# Patient Record
Sex: MALE | Race: WHITE | URBAN - METROPOLITAN AREA
[De-identification: names, ages, dates, MRNs, and addresses within clinical notes are randomized per-mention and may not be internally consistent; named-entity substitution may affect disease eponyms.]

---

## 2022-09-02 ENCOUNTER — OFFICE VISIT (OUTPATIENT)
Dept: URBAN - METROPOLITAN AREA CLINIC 128 | Facility: CLINIC | Age: 62
End: 2022-09-02
Payer: COMMERCIAL

## 2022-09-02 ENCOUNTER — WEB ENCOUNTER (OUTPATIENT)
Dept: URBAN - METROPOLITAN AREA CLINIC 128 | Facility: CLINIC | Age: 62
End: 2022-09-02

## 2022-09-02 VITALS — SYSTOLIC BLOOD PRESSURE: 138 MMHG | DIASTOLIC BLOOD PRESSURE: 80 MMHG | WEIGHT: 181.2 LBS | TEMPERATURE: 97.1 F

## 2022-09-02 DIAGNOSIS — R93.89 ABNORMAL CT SCAN, CHEST: ICD-10-CM

## 2022-09-02 DIAGNOSIS — Z79.01 CHRONIC ANTICOAGULATION: ICD-10-CM

## 2022-09-02 DIAGNOSIS — Z12.11 COLON CANCER SCREENING: ICD-10-CM

## 2022-09-02 PROBLEM — 711150003: Status: ACTIVE | Noted: 2022-09-02

## 2022-09-02 PROCEDURE — 99244 OFF/OP CNSLTJ NEW/EST MOD 40: CPT | Performed by: INTERNAL MEDICINE

## 2022-09-02 RX ORDER — FENOFIBRATE 145 MG/1
1 TABLET TABLET, FILM COATED ORAL ONCE A DAY
Status: ACTIVE | COMMUNITY

## 2022-09-02 RX ORDER — CLOPIDOGREL 75 MG/1
1 TABLET TABLET, FILM COATED ORAL ONCE A DAY
Status: ACTIVE | COMMUNITY

## 2022-09-02 RX ORDER — KRILL/OM-3/DHA/EPA/PHOSPHO/AST 1000-230MG
1 TABLET CAPSULE ORAL ONCE A DAY
Status: ACTIVE | COMMUNITY

## 2022-09-02 RX ORDER — ALBUTEROL SULFATE 90 UG/1
1 PUFF AS NEEDED AEROSOL, METERED RESPIRATORY (INHALATION)
Status: ACTIVE | COMMUNITY

## 2022-09-02 RX ORDER — PRASUGREL HYDROCHLORIDE 10 MG/1
AS DIRECTED TABLET, COATED ORAL
Status: ACTIVE | COMMUNITY

## 2022-09-02 RX ORDER — METOPROLOL SUCCINATE 25 MG/1
1 TABLET TABLET, FILM COATED, EXTENDED RELEASE ORAL ONCE A DAY
Status: ACTIVE | COMMUNITY

## 2022-09-02 RX ORDER — ATORVASTATIN CALCIUM 20 MG/1
1 TABLET TABLET, FILM COATED ORAL ONCE A DAY
Status: ACTIVE | COMMUNITY

## 2022-09-02 NOTE — HPI-TODAY'S VISIT:
Mr. Lawrence is a 62 year old  who was referred by Dr. Cisneros. A copy of this note will be sent to patient's referring physician.  On 5/27/2022 CT thorax without contrast was performed for pulmonary nodules; the CT showed diffuse thickening of the thoracic esophagus may be due to esophagitis but clinical correlation with EGD findings would be recommended to exclude esophageal cancer especially associated with the distal third of the esophagus.   He sees Dr. Hunt for PVD/AAA repair in 2015 and 2019. He is on effient and aspirin 81mg.   He sees Dr. Wang for CAD/HTN for metoprolol and imdur. He had heart cath 4/9/2022 x 3 stents, echo 4/9/2022 EF 52%.   He has never had a colonoscopy before.

## 2022-09-12 ENCOUNTER — TELEPHONE ENCOUNTER (OUTPATIENT)
Dept: URBAN - METROPOLITAN AREA CLINIC 19 | Facility: CLINIC | Age: 62
End: 2022-09-12

## 2022-09-20 ENCOUNTER — TELEPHONE ENCOUNTER (OUTPATIENT)
Dept: URBAN - METROPOLITAN AREA CLINIC 19 | Facility: CLINIC | Age: 62
End: 2022-09-20

## 2022-09-28 ENCOUNTER — OFFICE VISIT (OUTPATIENT)
Dept: URBAN - METROPOLITAN AREA SURGERY CENTER 31 | Facility: SURGERY CENTER | Age: 62
End: 2022-09-28
Payer: COMMERCIAL

## 2022-09-28 ENCOUNTER — CLAIMS CREATED FROM THE CLAIM WINDOW (OUTPATIENT)
Dept: URBAN - METROPOLITAN AREA CLINIC 4 | Facility: CLINIC | Age: 62
End: 2022-09-28
Payer: COMMERCIAL

## 2022-09-28 DIAGNOSIS — B37.81 CANDIDAL ESOPHAGITIS: ICD-10-CM

## 2022-09-28 DIAGNOSIS — K21.9 ACID REFLUX: ICD-10-CM

## 2022-09-28 DIAGNOSIS — K29.60 OTHER GASTRITIS WITHOUT BLEEDING: ICD-10-CM

## 2022-09-28 DIAGNOSIS — K29.60 ADENOPAPILLOMATOSIS GASTRICA: ICD-10-CM

## 2022-09-28 DIAGNOSIS — B37.81 CANDIDA: ICD-10-CM

## 2022-09-28 DIAGNOSIS — Z12.11 COLON CANCER SCREENING: ICD-10-CM

## 2022-09-28 DIAGNOSIS — K63.5 BENIGN COLON POLYP: ICD-10-CM

## 2022-09-28 DIAGNOSIS — B96.81 HELICOBACTER PYLORI [H. PYLORI] AS THE CAUSE OF DISEASES CLASSIFIED ELSEWHERE: ICD-10-CM

## 2022-09-28 DIAGNOSIS — K63.89 OTHER SPECIFIED DISEASES OF INTESTINE: ICD-10-CM

## 2022-09-28 DIAGNOSIS — K21.9 GASTRO-ESOPHAGEAL REFLUX DISEASE WITHOUT ESOPHAGITIS: ICD-10-CM

## 2022-09-28 DIAGNOSIS — B96.81 BACTERIAL INFECTION DUE TO H. PYLORI: ICD-10-CM

## 2022-09-28 PROCEDURE — 88312 SPECIAL STAINS GROUP 1: CPT | Performed by: PATHOLOGY

## 2022-09-28 PROCEDURE — G8907 PT DOC NO EVENTS ON DISCHARG: HCPCS | Performed by: INTERNAL MEDICINE

## 2022-09-28 PROCEDURE — 43239 EGD BIOPSY SINGLE/MULTIPLE: CPT | Performed by: INTERNAL MEDICINE

## 2022-09-28 PROCEDURE — 45385 COLONOSCOPY W/LESION REMOVAL: CPT | Performed by: INTERNAL MEDICINE

## 2022-09-28 PROCEDURE — 88305 TISSUE EXAM BY PATHOLOGIST: CPT | Performed by: PATHOLOGY

## 2022-09-28 PROCEDURE — 88342 IMHCHEM/IMCYTCHM 1ST ANTB: CPT | Performed by: PATHOLOGY

## 2022-09-28 RX ORDER — CLOPIDOGREL 75 MG/1
1 TABLET TABLET, FILM COATED ORAL ONCE A DAY
Status: ACTIVE | COMMUNITY

## 2022-09-28 RX ORDER — METOPROLOL SUCCINATE 25 MG/1
1 TABLET TABLET, FILM COATED, EXTENDED RELEASE ORAL ONCE A DAY
Status: ACTIVE | COMMUNITY

## 2022-09-28 RX ORDER — KRILL/OM-3/DHA/EPA/PHOSPHO/AST 1000-230MG
1 TABLET CAPSULE ORAL ONCE A DAY
Status: ACTIVE | COMMUNITY

## 2022-09-28 RX ORDER — ALBUTEROL SULFATE 90 UG/1
1 PUFF AS NEEDED AEROSOL, METERED RESPIRATORY (INHALATION)
Status: ACTIVE | COMMUNITY

## 2022-09-28 RX ORDER — ATORVASTATIN CALCIUM 20 MG/1
1 TABLET TABLET, FILM COATED ORAL ONCE A DAY
Status: ACTIVE | COMMUNITY

## 2022-09-28 RX ORDER — FENOFIBRATE 145 MG/1
1 TABLET TABLET, FILM COATED ORAL ONCE A DAY
Status: ACTIVE | COMMUNITY

## 2022-09-28 RX ORDER — PRASUGREL HYDROCHLORIDE 10 MG/1
AS DIRECTED TABLET, COATED ORAL
Status: ACTIVE | COMMUNITY

## 2022-10-14 ENCOUNTER — TELEPHONE ENCOUNTER (OUTPATIENT)
Dept: URBAN - METROPOLITAN AREA CLINIC 92 | Facility: CLINIC | Age: 62
End: 2022-10-14

## 2022-11-09 ENCOUNTER — CLAIMS CREATED FROM THE CLAIM WINDOW (OUTPATIENT)
Dept: URBAN - METROPOLITAN AREA CLINIC 19 | Facility: CLINIC | Age: 62
End: 2022-11-09
Payer: COMMERCIAL

## 2022-11-09 ENCOUNTER — WEB ENCOUNTER (OUTPATIENT)
Dept: URBAN - METROPOLITAN AREA CLINIC 19 | Facility: CLINIC | Age: 62
End: 2022-11-09

## 2022-11-09 VITALS
TEMPERATURE: 98.2 F | HEIGHT: 67 IN | DIASTOLIC BLOOD PRESSURE: 84 MMHG | HEART RATE: 78 BPM | WEIGHT: 189.8 LBS | SYSTOLIC BLOOD PRESSURE: 126 MMHG | OXYGEN SATURATION: 94 % | BODY MASS INDEX: 29.79 KG/M2

## 2022-11-09 DIAGNOSIS — A04.8 H. PYLORI INFECTION: ICD-10-CM

## 2022-11-09 DIAGNOSIS — K63.5 HYPERPLASTIC COLONIC POLYP, UNSPECIFIED PART OF COLON: ICD-10-CM

## 2022-11-09 DIAGNOSIS — B37.81 CANDIDA ESOPHAGITIS: ICD-10-CM

## 2022-11-09 DIAGNOSIS — R93.89 ABNORMAL CT SCAN, CHEST: ICD-10-CM

## 2022-11-09 PROCEDURE — 99214 OFFICE O/P EST MOD 30 MIN: CPT | Performed by: NURSE PRACTITIONER

## 2022-11-09 RX ORDER — ATORVASTATIN CALCIUM 20 MG/1
1 TABLET TABLET, FILM COATED ORAL ONCE A DAY
Status: ACTIVE | COMMUNITY

## 2022-11-09 RX ORDER — FENOFIBRATE 145 MG/1
1 TABLET TABLET, FILM COATED ORAL ONCE A DAY
Status: ACTIVE | COMMUNITY

## 2022-11-09 RX ORDER — METOPROLOL SUCCINATE 25 MG/1
1 TABLET TABLET, FILM COATED, EXTENDED RELEASE ORAL ONCE A DAY
Status: ACTIVE | COMMUNITY

## 2022-11-09 RX ORDER — AMOXICILLIN 500 MG/1
2 CAPSULES CAPSULE ORAL TWICE A DAY
Qty: 56 CAPSULE | Refills: 0 | OUTPATIENT
Start: 2022-11-09 | End: 2022-11-23

## 2022-11-09 RX ORDER — PRASUGREL HYDROCHLORIDE 10 MG/1
AS DIRECTED TABLET, COATED ORAL
Status: ACTIVE | COMMUNITY

## 2022-11-09 RX ORDER — APIXABAN 2.5 MG/1
AS DIRECTED TABLET, FILM COATED ORAL
Status: ACTIVE | COMMUNITY

## 2022-11-09 RX ORDER — PANTOPRAZOLE SODIUM 40 MG/1
1 TABLET 30 MINUTES BEFORE MEALS TABLET, DELAYED RELEASE ORAL TWICE A DAY
Qty: 30 | Refills: 0 | OUTPATIENT
Start: 2022-11-09

## 2022-11-09 RX ORDER — CLARITHROMYCIN 500 MG/1
1 TABLET TABLET, FILM COATED ORAL TWICE A DAY
Qty: 28 TABLET | Refills: 0 | OUTPATIENT
Start: 2022-11-09 | End: 2022-11-23

## 2022-11-09 RX ORDER — KRILL/OM-3/DHA/EPA/PHOSPHO/AST 1000-230MG
1 TABLET CAPSULE ORAL ONCE A DAY
Status: ACTIVE | COMMUNITY

## 2022-11-09 RX ORDER — ALBUTEROL SULFATE 90 UG/1
1 PUFF AS NEEDED AEROSOL, METERED RESPIRATORY (INHALATION)
Status: ACTIVE | COMMUNITY

## 2022-11-09 RX ORDER — CLOPIDOGREL 75 MG/1
1 TABLET TABLET, FILM COATED ORAL ONCE A DAY
Status: ON HOLD | COMMUNITY

## 2022-11-09 NOTE — HPI-TODAY'S VISIT:
9/2/22 DR Rangel: Mr. Lawrence is a 62 year old  who was referred by Dr. Cisneros.  - CT thorax without contrast 5/27/2022 was performed for pulmonary nodules; the CT showed diffuse thickening of the thoracic esophagus may be due to esophagitis but clinical correlation with EGD findings would be recommended to exclude esophageal cancer especially associated with the distal third of the esophagus.  He sees Dr. Hunt for PVD/AAA repair in 2015 and 2019. He is on effient and aspirin 81mg.  He sees Dr. Wang for CAD/HTN for metoprolol and imdur. He had heart cath 4/9/2022 x 3 stents, echo 4/9/2022 EF 52%.  He has never had a colonoscopy before. >>TODAY -EGD 9/28/2022 by Dr. 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 He was placed on nystatin.  -Colonoscopy 9/28/2022 5 mm ascending colon polyp, internal hemorrhoids, pathology report shows hyperplastic polyps H. pylori has not been treated yet. He has completed his Nystatin elixir x14 days.  Denies nausea/vomiting, abdominal pain, dysphagia. Admits occasional reflux 1-2 times a month. He is not taking a PPI at this time. Denies constipation/diarrhea. Denies bloody/black stools.

## 2022-11-29 ENCOUNTER — ERX REFILL RESPONSE (OUTPATIENT)
Dept: URBAN - METROPOLITAN AREA CLINIC 19 | Facility: CLINIC | Age: 62
End: 2022-11-29

## 2022-11-29 RX ORDER — PANTOPRAZOLE SODIUM 40 MG/1
1 TABLET TABLET, DELAYED RELEASE ORAL ONCE A DAY
Qty: 30 | Refills: 5 | OUTPATIENT

## 2022-11-29 RX ORDER — PANTOPRAZOLE SODIUM 40 MG/1
1 TABLET 30 MINUTES BEFORE MEALS TABLET, DELAYED RELEASE ORAL TWICE A DAY
Qty: 30 | Refills: 0 | OUTPATIENT

## 2023-01-11 ENCOUNTER — OFFICE VISIT (OUTPATIENT)
Dept: URBAN - METROPOLITAN AREA CLINIC 19 | Facility: CLINIC | Age: 63
End: 2023-01-11
Payer: COMMERCIAL

## 2023-01-11 VITALS
HEIGHT: 67 IN | DIASTOLIC BLOOD PRESSURE: 72 MMHG | BODY MASS INDEX: 29.88 KG/M2 | SYSTOLIC BLOOD PRESSURE: 138 MMHG | TEMPERATURE: 98.5 F | WEIGHT: 190.4 LBS

## 2023-01-11 DIAGNOSIS — A04.8 H. PYLORI INFECTION: ICD-10-CM

## 2023-01-11 DIAGNOSIS — B37.81 CANDIDA ESOPHAGITIS: ICD-10-CM

## 2023-01-11 DIAGNOSIS — K63.5 HYPERPLASTIC COLONIC POLYP, UNSPECIFIED PART OF COLON: ICD-10-CM

## 2023-01-11 DIAGNOSIS — K21.9 GERD WITHOUT ESOPHAGITIS: ICD-10-CM

## 2023-01-11 PROBLEM — 266435005: Status: ACTIVE | Noted: 2023-01-11

## 2023-01-11 PROBLEM — 16900311000119102: Status: ACTIVE | Noted: 2022-09-02

## 2023-01-11 PROCEDURE — 99213 OFFICE O/P EST LOW 20 MIN: CPT | Performed by: NURSE PRACTITIONER

## 2023-01-11 RX ORDER — APIXABAN 2.5 MG/1
AS DIRECTED TABLET, FILM COATED ORAL
Status: ACTIVE | COMMUNITY

## 2023-01-11 RX ORDER — FENOFIBRATE 145 MG/1
1 TABLET TABLET, FILM COATED ORAL ONCE A DAY
Status: ACTIVE | COMMUNITY

## 2023-01-11 RX ORDER — METOPROLOL SUCCINATE 25 MG/1
1 TABLET TABLET, FILM COATED, EXTENDED RELEASE ORAL ONCE A DAY
Status: ACTIVE | COMMUNITY

## 2023-01-11 RX ORDER — CLOPIDOGREL 75 MG/1
1 TABLET TABLET, FILM COATED ORAL ONCE A DAY
Status: ON HOLD | COMMUNITY

## 2023-01-11 RX ORDER — ALBUTEROL SULFATE 90 UG/1
1 PUFF AS NEEDED AEROSOL, METERED RESPIRATORY (INHALATION)
Status: ACTIVE | COMMUNITY

## 2023-01-11 RX ORDER — PANTOPRAZOLE SODIUM 40 MG/1
1 TABLET TABLET, DELAYED RELEASE ORAL ONCE A DAY
Qty: 30 | Refills: 5 | Status: ACTIVE | COMMUNITY

## 2023-01-11 RX ORDER — PRASUGREL HYDROCHLORIDE 10 MG/1
AS DIRECTED TABLET, COATED ORAL
Status: ACTIVE | COMMUNITY

## 2023-01-11 RX ORDER — KRILL/OM-3/DHA/EPA/PHOSPHO/AST 1000-230MG
1 TABLET CAPSULE ORAL ONCE A DAY
Status: ACTIVE | COMMUNITY

## 2023-01-11 RX ORDER — ATORVASTATIN CALCIUM 20 MG/1
1 TABLET TABLET, FILM COATED ORAL ONCE A DAY
Status: ACTIVE | COMMUNITY

## 2023-01-11 NOTE — HPI-TODAY'S VISIT:
9/2/22 DR Rangel: Mr. Lawrence is a 62 year old  who was referred by Dr. Cisneros.  - CT thorax without contrast 5/27/2022 was performed for pulmonary nodules; the CT showed diffuse thickening of the thoracic esophagus may be due to esophagitis but clinical correlation with EGD findings would be recommended to exclude esophageal cancer especially associated with the distal third of the esophagus.  He sees Dr. Hunt for PVD/AAA repair in 2015 and 2019. He is on effient and aspirin 81mg.  He sees Dr. Wang for CAD/HTN for metoprolol and imdur. He had heart cath 4/9/2022 x 3 stents, echo 4/9/2022 EF 52%.  He has never had a colonoscopy before.  11/9/2022 -EGD 9/28/2022 by Dr. 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 He was placed on nystatin.  -Colonoscopy 9/28/2022 5 mm ascending colon polyp, internal hemorrhoids, pathology report shows hyperplastic polyps H. pylori has not been treated yet. He has completed his Nystatin elixir x14 days.  Denies nausea/vomiting, abdominal pain, dysphagia. Admits occasional reflux 1-2 times a month. He is not taking a PPI at this time. Denies constipation/diarrhea. Denies bloody/black stools. Plan pantoprazole, amoxicillin, clarithromycin, recheck H. pylori at follow up  TODAY 1/11/23 He completed his antibiotics about 4 weeks ago.  Denies nausea, abdominal pain, dysphagia, GERD. Stools are formed, easy to pass, denies bloody stools. He is not currently on a PPI, has rare reflux. Will have him complete H pylori breath test.

## 2023-01-13 ENCOUNTER — TELEPHONE ENCOUNTER (OUTPATIENT)
Dept: URBAN - METROPOLITAN AREA CLINIC 19 | Facility: CLINIC | Age: 63
End: 2023-01-13

## 2023-01-18 ENCOUNTER — OFFICE VISIT (OUTPATIENT)
Dept: URBAN - METROPOLITAN AREA CLINIC 18 | Facility: CLINIC | Age: 63
End: 2023-01-18
Payer: COMMERCIAL

## 2023-01-18 DIAGNOSIS — A04.8 H. PYLORI INFECTION: ICD-10-CM

## 2023-01-18 PROCEDURE — 83014 H PYLORI DRUG ADMIN: CPT | Performed by: INTERNAL MEDICINE

## 2023-01-24 ENCOUNTER — OFFICE VISIT (OUTPATIENT)
Dept: URBAN - METROPOLITAN AREA CLINIC 91 | Facility: CLINIC | Age: 63
End: 2023-01-24
Payer: COMMERCIAL

## 2023-01-24 DIAGNOSIS — A04.8 H. PYLORI INFECTION: ICD-10-CM

## 2023-01-24 PROCEDURE — 83013 H PYLORI (C-13) BREATH: CPT | Performed by: INTERNAL MEDICINE

## 2023-01-25 ENCOUNTER — TELEPHONE ENCOUNTER (OUTPATIENT)
Dept: URBAN - METROPOLITAN AREA CLINIC 19 | Facility: CLINIC | Age: 63
End: 2023-01-25

## 2023-01-25 RX ORDER — METRONIDAZOLE 250 MG/1
1 TABLET TABLET ORAL
Qty: 56 TABLET | Refills: 0 | OUTPATIENT
Start: 2023-01-25 | End: 2023-02-08

## 2023-01-25 RX ORDER — TETRACYCLINE HYDROCHLORIDE 500 MG/1
1 CAPSULE CAPSULE ORAL
Qty: 56 CAPSULE | Refills: 0 | OUTPATIENT
Start: 2023-01-25 | End: 2023-02-08

## 2023-01-25 RX ORDER — PANTOPRAZOLE SODIUM 40 MG/1
1 TABLET TABLET, DELAYED RELEASE ORAL TWICE A DAY
Qty: 28 TABLET | Refills: 0 | OUTPATIENT
Start: 2023-01-25

## 2023-01-25 RX ORDER — BISMUTH SUBSALICYLATE 262 MG/1
2 TABLETS WITH MEALS AND AT BEDTIME TABLET, CHEWABLE ORAL
Qty: 112 TABLET | Refills: 0 | OUTPATIENT
Start: 2023-01-25 | End: 2023-02-08

## 2023-02-06 ENCOUNTER — ERX REFILL RESPONSE (OUTPATIENT)
Dept: URBAN - METROPOLITAN AREA CLINIC 19 | Facility: CLINIC | Age: 63
End: 2023-02-06

## 2023-02-06 RX ORDER — PANTOPRAZOLE 40 MG/1
TAKE ONE TABLET BY MOUTH TWICE A DAY FOR 14 DAYS TABLET, DELAYED RELEASE ORAL
Qty: 28 TABLET | Refills: 0 | OUTPATIENT

## 2023-02-06 RX ORDER — PANTOPRAZOLE SODIUM 40 MG/1
1 TABLET TABLET, DELAYED RELEASE ORAL TWICE A DAY
Qty: 28 TABLET | Refills: 0 | OUTPATIENT

## 2023-11-06 NOTE — PHYSICAL EXAM HENT:
Head,  normocephalic Ears,  External ears within normal limits Nose/Nasopharynx,  External nose  normal appearance  Lips,  Appearance normal Sent for ultrasound of the aorta and PVR study

## 2023-11-30 ENCOUNTER — ERX REFILL RESPONSE (OUTPATIENT)
Dept: URBAN - METROPOLITAN AREA CLINIC 19 | Facility: CLINIC | Age: 63
End: 2023-11-30

## 2023-11-30 RX ORDER — PANTOPRAZOLE 40 MG/1
TAKE ONE TABLET BY MOUTH DAILY TABLET, DELAYED RELEASE ORAL
Qty: 30 TABLET | Refills: 2 | OUTPATIENT

## 2023-11-30 RX ORDER — PANTOPRAZOLE 40 MG/1
TAKE ONE TABLET BY MOUTH TWICE A DAY FOR 14 DAYS TABLET, DELAYED RELEASE ORAL
Qty: 28 TABLET | Refills: 0 | OUTPATIENT

## 2024-05-17 ENCOUNTER — TELEPHONE ENCOUNTER (OUTPATIENT)
Dept: URBAN - METROPOLITAN AREA CLINIC 128 | Facility: CLINIC | Age: 64
End: 2024-05-17

## 2024-05-17 ENCOUNTER — DASHBOARD ENCOUNTERS (OUTPATIENT)
Age: 64
End: 2024-05-17

## 2024-05-17 ENCOUNTER — OFFICE VISIT (OUTPATIENT)
Dept: URBAN - METROPOLITAN AREA CLINIC 128 | Facility: CLINIC | Age: 64
End: 2024-05-17
Payer: MEDICARE

## 2024-05-17 VITALS
SYSTOLIC BLOOD PRESSURE: 163 MMHG | HEIGHT: 67 IN | BODY MASS INDEX: 30.29 KG/M2 | DIASTOLIC BLOOD PRESSURE: 84 MMHG | HEART RATE: 73 BPM | TEMPERATURE: 98.1 F | WEIGHT: 193 LBS

## 2024-05-17 DIAGNOSIS — Z86.19 HISTORY OF HELICOBACTER PYLORI INFECTION: ICD-10-CM

## 2024-05-17 DIAGNOSIS — Z12.11 COLON CANCER SCREENING: ICD-10-CM

## 2024-05-17 PROCEDURE — 99214 OFFICE O/P EST MOD 30 MIN: CPT | Performed by: INTERNAL MEDICINE

## 2024-05-17 RX ORDER — PANTOPRAZOLE 40 MG/1
TAKE ONE TABLET BY MOUTH DAILY TABLET, DELAYED RELEASE ORAL
Qty: 30 TABLET | Refills: 2 | Status: ACTIVE | COMMUNITY

## 2024-05-17 RX ORDER — APIXABAN 2.5 MG/1
AS DIRECTED TABLET, FILM COATED ORAL
Status: ACTIVE | COMMUNITY

## 2024-05-17 RX ORDER — ALBUTEROL SULFATE 90 UG/1
1 PUFF AS NEEDED AEROSOL, METERED RESPIRATORY (INHALATION)
Status: ACTIVE | COMMUNITY

## 2024-05-17 RX ORDER — ATORVASTATIN CALCIUM 20 MG/1
1 TABLET TABLET, FILM COATED ORAL ONCE A DAY
Status: ACTIVE | COMMUNITY

## 2024-05-17 RX ORDER — FENOFIBRATE 145 MG/1
1 TABLET TABLET, FILM COATED ORAL ONCE A DAY
Status: ACTIVE | COMMUNITY

## 2024-05-17 RX ORDER — CLOPIDOGREL 75 MG/1
1 TABLET TABLET, FILM COATED ORAL ONCE A DAY
Status: DISCONTINUED | COMMUNITY

## 2024-05-17 RX ORDER — PRASUGREL HYDROCHLORIDE 10 MG/1
AS DIRECTED TABLET, COATED ORAL
Status: ACTIVE | COMMUNITY

## 2024-05-17 RX ORDER — METOPROLOL SUCCINATE 25 MG/1
1 TABLET TABLET, FILM COATED, EXTENDED RELEASE ORAL ONCE A DAY
Status: ACTIVE | COMMUNITY

## 2024-05-17 RX ORDER — KRILL/OM-3/DHA/EPA/PHOSPHO/AST 1000-230MG
1 TABLET CAPSULE ORAL ONCE A DAY
Status: ACTIVE | COMMUNITY

## 2024-05-29 LAB — H PYLORI BREATH TEST: NOT DETECTED

## 2024-06-19 ENCOUNTER — OFFICE VISIT (OUTPATIENT)
Dept: URBAN - METROPOLITAN AREA CLINIC 128 | Facility: CLINIC | Age: 64
End: 2024-06-19

## 2024-06-19 RX ORDER — ATORVASTATIN CALCIUM 20 MG/1
1 TABLET TABLET, FILM COATED ORAL ONCE A DAY
Status: ACTIVE | COMMUNITY

## 2024-06-19 RX ORDER — PRASUGREL HYDROCHLORIDE 10 MG/1
AS DIRECTED TABLET, COATED ORAL
Status: ACTIVE | COMMUNITY

## 2024-06-19 RX ORDER — KRILL/OM-3/DHA/EPA/PHOSPHO/AST 1000-230MG
1 TABLET CAPSULE ORAL ONCE A DAY
Status: ACTIVE | COMMUNITY

## 2024-06-19 RX ORDER — METOPROLOL SUCCINATE 25 MG/1
1 TABLET TABLET, FILM COATED, EXTENDED RELEASE ORAL ONCE A DAY
Status: ACTIVE | COMMUNITY

## 2024-06-19 RX ORDER — FENOFIBRATE 145 MG/1
1 TABLET TABLET, FILM COATED ORAL ONCE A DAY
Status: ACTIVE | COMMUNITY

## 2024-06-19 RX ORDER — PANTOPRAZOLE 40 MG/1
TAKE ONE TABLET BY MOUTH DAILY TABLET, DELAYED RELEASE ORAL
Qty: 30 TABLET | Refills: 2 | Status: ACTIVE | COMMUNITY

## 2024-06-19 RX ORDER — ALBUTEROL SULFATE 90 UG/1
1 PUFF AS NEEDED AEROSOL, METERED RESPIRATORY (INHALATION)
Status: ACTIVE | COMMUNITY

## 2024-06-19 RX ORDER — APIXABAN 2.5 MG/1
AS DIRECTED TABLET, FILM COATED ORAL
Status: ACTIVE | COMMUNITY

## 2024-11-05 ENCOUNTER — APPOINTMENT (RX ONLY)
Age: 64
Setting detail: DERMATOLOGY
End: 2024-11-05

## 2024-11-05 DIAGNOSIS — D22 MELANOCYTIC NEVI: ICD-10-CM

## 2024-11-05 DIAGNOSIS — L82.1 OTHER SEBORRHEIC KERATOSIS: ICD-10-CM

## 2024-11-05 DIAGNOSIS — D18.0 HEMANGIOMA: ICD-10-CM

## 2024-11-05 DIAGNOSIS — L81.4 OTHER MELANIN HYPERPIGMENTATION: ICD-10-CM

## 2024-11-05 DIAGNOSIS — L57.0 ACTINIC KERATOSIS: ICD-10-CM

## 2024-11-05 PROBLEM — D18.01 HEMANGIOMA OF SKIN AND SUBCUTANEOUS TISSUE: Status: ACTIVE | Noted: 2024-11-05

## 2024-11-05 PROBLEM — D22.5 MELANOCYTIC NEVI OF TRUNK: Status: ACTIVE | Noted: 2024-11-05

## 2024-11-05 PROCEDURE — ? COUNSELING

## 2024-11-05 PROCEDURE — ? LIQUID NITROGEN

## 2024-11-05 PROCEDURE — 17003 DESTRUCT PREMALG LES 2-14: CPT

## 2024-11-05 PROCEDURE — 17000 DESTRUCT PREMALG LESION: CPT

## 2024-11-05 PROCEDURE — 99203 OFFICE O/P NEW LOW 30 MIN: CPT | Mod: 25

## 2024-11-05 ASSESSMENT — LOCATION DETAILED DESCRIPTION DERM
LOCATION DETAILED: RIGHT SUPERIOR LATERAL FOREHEAD
LOCATION DETAILED: STERNUM
LOCATION DETAILED: RIGHT CENTRAL PARIETAL SCALP
LOCATION DETAILED: LEFT MEDIAL FRONTAL SCALP
LOCATION DETAILED: LEFT SUPERIOR LATERAL FOREHEAD
LOCATION DETAILED: RIGHT DISTAL DORSAL FOREARM
LOCATION DETAILED: PERIUMBILICAL SKIN
LOCATION DETAILED: LEFT CENTRAL PARIETAL SCALP
LOCATION DETAILED: LEFT DISTAL DORSAL FOREARM

## 2024-11-05 ASSESSMENT — LOCATION ZONE DERM
LOCATION ZONE: SCALP
LOCATION ZONE: FACE
LOCATION ZONE: TRUNK
LOCATION ZONE: ARM

## 2024-11-05 ASSESSMENT — LOCATION SIMPLE DESCRIPTION DERM
LOCATION SIMPLE: RIGHT FOREARM
LOCATION SIMPLE: RIGHT FOREHEAD
LOCATION SIMPLE: LEFT FOREHEAD
LOCATION SIMPLE: ABDOMEN
LOCATION SIMPLE: LEFT FOREARM
LOCATION SIMPLE: SCALP
LOCATION SIMPLE: CHEST
LOCATION SIMPLE: LEFT SCALP

## 2024-11-05 NOTE — PROCEDURE: LIQUID NITROGEN
Post-Care Instructions: I reviewed with the patient in detail post-care instructions. Patient is to wear sunprotection, and avoid picking at any of the treated lesions. Pt may apply Vaseline to crusted or scabbing areas.
Render Post-Care Instructions In Note?: no
Duration Of Freeze Thaw-Cycle (Seconds): 5
Show Aperture Variable?: Yes
Consent: The patient's consent was obtained including but not limited to risks of crusting, scabbing, blistering, scarring, darker or lighter pigmentary change, recurrence, incomplete removal and infection.
Detail Level: Detailed
Never

## 2024-11-05 NOTE — PROCEDURE: MIPS QUALITY
Detail Level: Detailed
Quality 431: Preventive Care And Screening: Unhealthy Alcohol Use - Screening: Patient not identified as an unhealthy alcohol user when screened for unhealthy alcohol use using a systematic screening method
Name And Contact Information For Health Care Proxy: Marisa Cantrell (daughter) [phone]
Quality 226: Preventive Care And Screening: Tobacco Use: Screening And Cessation Intervention: Patient screened for tobacco use, is a smoker AND received Cessation Counseling within measurement period or in the six months prior to the measurement period